# Patient Record
Sex: FEMALE | Race: BLACK OR AFRICAN AMERICAN | Employment: UNEMPLOYED | URBAN - METROPOLITAN AREA
[De-identification: names, ages, dates, MRNs, and addresses within clinical notes are randomized per-mention and may not be internally consistent; named-entity substitution may affect disease eponyms.]

---
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
## 2022-03-18 PROBLEM — F41.9 ANXIETY: Status: ACTIVE | Noted: 2021-06-27

## 2022-03-19 PROBLEM — R10.84 GENERALIZED ABDOMINAL PAIN: Status: ACTIVE | Noted: 2020-12-13

## 2022-03-19 PROBLEM — L73.2 HIDRADENITIS SUPPURATIVA OF RIGHT AXILLA: Status: ACTIVE | Noted: 2021-02-24

## 2022-03-19 PROBLEM — H47.239 DEEP OPTIC CUPS: Status: ACTIVE | Noted: 2017-01-03

## 2023-05-11 RX ORDER — ESCITALOPRAM OXALATE 5 MG/1
1 TABLET ORAL DAILY
COMMUNITY
Start: 2021-06-22

## 2023-05-11 RX ORDER — NORETHINDRONE ACETATE AND ETHINYL ESTRADIOL AND FERROUS FUMARATE 1MG-20(24)
KIT ORAL
COMMUNITY
Start: 2020-11-17